# Patient Record
Sex: FEMALE | Race: WHITE | NOT HISPANIC OR LATINO | URBAN - METROPOLITAN AREA
[De-identification: names, ages, dates, MRNs, and addresses within clinical notes are randomized per-mention and may not be internally consistent; named-entity substitution may affect disease eponyms.]

---

## 2017-02-20 ENCOUNTER — OUTPATIENT (OUTPATIENT)
Dept: OUTPATIENT SERVICES | Facility: HOSPITAL | Age: 52
LOS: 1 days | End: 2017-02-20

## 2017-03-02 ENCOUNTER — OUTPATIENT (OUTPATIENT)
Dept: OUTPATIENT SERVICES | Facility: HOSPITAL | Age: 52
LOS: 1 days | End: 2017-03-02

## 2017-03-08 ENCOUNTER — OUTPATIENT (OUTPATIENT)
Dept: OUTPATIENT SERVICES | Facility: HOSPITAL | Age: 52
LOS: 1 days | End: 2017-03-08
Payer: COMMERCIAL

## 2017-03-08 PROCEDURE — 76770 US EXAM ABDO BACK WALL COMP: CPT | Mod: 26

## 2017-03-10 ENCOUNTER — OUTPATIENT (OUTPATIENT)
Dept: OUTPATIENT SERVICES | Facility: HOSPITAL | Age: 52
LOS: 1 days | End: 2017-03-10

## 2017-03-12 ENCOUNTER — OUTPATIENT (OUTPATIENT)
Dept: OUTPATIENT SERVICES | Facility: HOSPITAL | Age: 52
LOS: 1 days | End: 2017-03-12
Payer: COMMERCIAL

## 2017-03-12 PROCEDURE — 74177 CT ABD & PELVIS W/CONTRAST: CPT | Mod: 26

## 2018-02-21 ENCOUNTER — INPATIENT (INPATIENT)
Facility: HOSPITAL | Age: 53
LOS: 1 days | Discharge: HOME | End: 2018-02-23
Payer: COMMERCIAL

## 2018-02-21 PROCEDURE — 74177 CT ABD & PELVIS W/CONTRAST: CPT | Mod: 26

## 2018-02-22 PROCEDURE — 76856 US EXAM PELVIC COMPLETE: CPT | Mod: 26

## 2018-02-22 PROCEDURE — 72148 MRI LUMBAR SPINE W/O DYE: CPT | Mod: 26

## 2018-02-22 PROCEDURE — 76830 TRANSVAGINAL US NON-OB: CPT | Mod: 26

## 2018-05-01 PROBLEM — Z00.00 ENCOUNTER FOR PREVENTIVE HEALTH EXAMINATION: Status: ACTIVE | Noted: 2018-05-01

## 2018-05-09 ENCOUNTER — OUTPATIENT (OUTPATIENT)
Dept: OUTPATIENT SERVICES | Facility: HOSPITAL | Age: 53
LOS: 1 days | End: 2018-05-09

## 2018-07-26 ENCOUNTER — OTHER (OUTPATIENT)
Age: 53
End: 2018-07-26

## 2019-09-14 RX ORDER — IODOQUINOL, HYDROCORTISONE ACETATE AND ALOE VERA LEAF 10; 20; 10 MG/G; MG/G; MG/G
1-2-1 GEL TOPICAL 3 TIMES DAILY
Qty: 1 | Refills: 3 | Status: ACTIVE | COMMUNITY
Start: 2019-09-14 | End: 1900-01-01

## 2020-01-08 ENCOUNTER — APPOINTMENT (OUTPATIENT)
Dept: OBGYN | Facility: CLINIC | Age: 55
End: 2020-01-08

## 2020-01-28 ENCOUNTER — APPOINTMENT (OUTPATIENT)
Dept: OBGYN | Facility: CLINIC | Age: 55
End: 2020-01-28
Payer: COMMERCIAL

## 2020-01-28 VITALS
WEIGHT: 170 LBS | BODY MASS INDEX: 28.32 KG/M2 | DIASTOLIC BLOOD PRESSURE: 90 MMHG | SYSTOLIC BLOOD PRESSURE: 134 MMHG | HEIGHT: 65 IN

## 2020-01-28 DIAGNOSIS — Z86.018 PERSONAL HISTORY OF OTHER BENIGN NEOPLASM: ICD-10-CM

## 2020-01-28 DIAGNOSIS — R10.30 LOWER ABDOMINAL PAIN, UNSPECIFIED: ICD-10-CM

## 2020-01-28 DIAGNOSIS — Z87.898 PERSONAL HISTORY OF OTHER SPECIFIED CONDITIONS: ICD-10-CM

## 2020-01-28 PROCEDURE — 99396 PREV VISIT EST AGE 40-64: CPT

## 2020-01-28 RX ORDER — LANSOPRAZOLE 30 MG
VIAL (EA) INTRAVENOUS
Refills: 0 | Status: ACTIVE | COMMUNITY

## 2020-01-28 RX ORDER — LEVOTHYROXINE SODIUM 0.17 MG/1
TABLET ORAL
Refills: 0 | Status: ACTIVE | COMMUNITY

## 2020-01-28 NOTE — CHIEF COMPLAINT
[Annual Visit] : annual visit [FreeTextEntry1] : 53 y/o P2 with hx of one  after primary c/s. Hx of renal cancer in 2017, parathyroidectomy in 2018 and microadenoma years ago which has not been seen again on subsequent cat scans after treatment.\par Pt presents for annual exam\par LMP was in 2017-no vaginal bleeding since that time\par MAmmo was BIRAD 1 in beginning of 2020\par COlonoscopy was 2 years ago\par Bone scan was normal 2018\par Pt sexually active with only one partner-declines STI testing

## 2020-01-29 LAB — HPV HIGH+LOW RISK DNA PNL CVX: NOT DETECTED

## 2020-02-03 LAB — CYTOLOGY CVX/VAG DOC THIN PREP: ABNORMAL

## 2020-04-26 ENCOUNTER — MESSAGE (OUTPATIENT)
Age: 55
End: 2020-04-26

## 2020-05-05 ENCOUNTER — APPOINTMENT (OUTPATIENT)
Dept: DISASTER EMERGENCY | Facility: CLINIC | Age: 55
End: 2020-05-05

## 2020-05-06 ENCOUNTER — APPOINTMENT (OUTPATIENT)
Dept: DISASTER EMERGENCY | Facility: HOSPITAL | Age: 55
End: 2020-05-06

## 2020-05-07 LAB
SARS-COV-2 IGG SERPL IA-ACNC: 0.7 AU/ML
SARS-COV-2 IGG SERPL QL IA: NEGATIVE

## 2021-05-26 ENCOUNTER — APPOINTMENT (OUTPATIENT)
Dept: OBGYN | Facility: CLINIC | Age: 56
End: 2021-05-26
Payer: COMMERCIAL

## 2021-05-26 VITALS
BODY MASS INDEX: 29.99 KG/M2 | HEIGHT: 65 IN | DIASTOLIC BLOOD PRESSURE: 96 MMHG | SYSTOLIC BLOOD PRESSURE: 146 MMHG | WEIGHT: 180 LBS

## 2021-05-26 DIAGNOSIS — N94.10 UNSPECIFIED DYSPAREUNIA: ICD-10-CM

## 2021-05-26 DIAGNOSIS — N95.1 MENOPAUSAL AND FEMALE CLIMACTERIC STATES: ICD-10-CM

## 2021-05-26 DIAGNOSIS — Z85.528 PERSONAL HISTORY OF OTHER MALIGNANT NEOPLASM OF KIDNEY: ICD-10-CM

## 2021-05-26 DIAGNOSIS — N95.2 POSTMENOPAUSAL ATROPHIC VAGINITIS: ICD-10-CM

## 2021-05-26 PROCEDURE — 99213 OFFICE O/P EST LOW 20 MIN: CPT | Mod: 25

## 2021-05-26 PROCEDURE — 99072 ADDL SUPL MATRL&STAF TM PHE: CPT

## 2021-05-26 PROCEDURE — 99396 PREV VISIT EST AGE 40-64: CPT | Mod: 25

## 2021-05-26 NOTE — DISCUSSION/SUMMARY
[FreeTextEntry1] : 57 y/o P2 with normal annual exam today-vaginal atrophy and dysparunia \par Pap collected\par Mammo rx'd\par Discussed with pt vaginal dryness causes and treatment\par She has hot flushes but these are not bothering her as much as the dryness\par She does not want systemic estrogen\par Explained lubricants and moisturizers and types of vaginal estrogen\par Sent estring rx-if too expensive will switch to tablets. Pt aware of this and how to use discussed\par F/U for annual or prn

## 2021-05-26 NOTE — PHYSICAL EXAM
[Appropriately responsive] : appropriately responsive [Alert] : alert [No Acute Distress] : no acute distress [No Lymphadenopathy] : no lymphadenopathy [No Murmurs] : no murmurs [Soft] : soft [Non-tender] : non-tender [Non-distended] : non-distended [No HSM] : No HSM [No Lesions] : no lesions [No Mass] : no mass [Oriented x3] : oriented x3 [Examination Of The Breasts] : a normal appearance [No Masses] : no breast masses were palpable [Vulvar Atrophy] : vulvar atrophy [Labia Majora] : normal [Labia Minora] : normal [Atrophy] : atrophy [Scant] : There was scant vaginal bleeding [Normal] : normal [Uterine Adnexae] : normal [FreeTextEntry4] : b [FreeTextEntry5] : small bleeding with intro of cytobrush

## 2021-05-26 NOTE — HISTORY OF PRESENT ILLNESS
[FreeTextEntry1] : 55 y/o P2 presents for annual exam. Pt has hx of kidney cancer \par REceived from pt oncologist full report which reports good recovery-now extending to yearly scans and visits\par Pt last pap was NILM with neg HPV\par Mammo was BIRAD 1 1/2020\par Bone denisty 2018 was normal\par Colonoscopy in 2018 was normal\par LMP was 2018-no vb since then\par Sexually active with intercourse becoming more painful recently and does not like vaginal dryness\par No other complaints-declines STI testing\par Does want yearly paps\par

## 2021-05-28 LAB — HPV HIGH+LOW RISK DNA PNL CVX: NOT DETECTED

## 2021-06-02 LAB — CYTOLOGY CVX/VAG DOC THIN PREP: ABNORMAL

## 2022-07-29 ENCOUNTER — RX RENEWAL (OUTPATIENT)
Age: 57
End: 2022-07-29

## 2022-10-24 DIAGNOSIS — R30.0 DYSURIA: ICD-10-CM

## 2022-10-24 RX ORDER — PHENAZOPYRIDINE 100 MG/1
100 TABLET, FILM COATED ORAL EVERY 6 HOURS
Qty: 12 | Refills: 0 | Status: ACTIVE | COMMUNITY
Start: 2022-10-24 | End: 1900-01-01

## 2022-10-24 RX ORDER — NITROFURANTOIN (MONOHYDRATE/MACROCRYSTALS) 25; 75 MG/1; MG/1
100 CAPSULE ORAL
Qty: 14 | Refills: 0 | Status: ACTIVE | COMMUNITY
Start: 2022-10-24 | End: 1900-01-01

## 2023-01-04 ENCOUNTER — NON-APPOINTMENT (OUTPATIENT)
Age: 58
End: 2023-01-04

## 2023-01-12 ENCOUNTER — APPOINTMENT (OUTPATIENT)
Dept: OPHTHALMOLOGY | Facility: CLINIC | Age: 58
End: 2023-01-12
Payer: COMMERCIAL

## 2023-01-12 ENCOUNTER — NON-APPOINTMENT (OUTPATIENT)
Age: 58
End: 2023-01-12

## 2023-01-12 PROCEDURE — 92004 COMPRE OPH EXAM NEW PT 1/>: CPT

## 2023-02-07 ENCOUNTER — APPOINTMENT (OUTPATIENT)
Dept: OBGYN | Facility: CLINIC | Age: 58
End: 2023-02-07
Payer: COMMERCIAL

## 2023-02-07 VITALS
HEIGHT: 65 IN | BODY MASS INDEX: 30.82 KG/M2 | DIASTOLIC BLOOD PRESSURE: 64 MMHG | WEIGHT: 185 LBS | SYSTOLIC BLOOD PRESSURE: 118 MMHG

## 2023-02-07 DIAGNOSIS — N95.1 MENOPAUSAL AND FEMALE CLIMACTERIC STATES: ICD-10-CM

## 2023-02-07 DIAGNOSIS — E06.3 AUTOIMMUNE THYROIDITIS: ICD-10-CM

## 2023-02-07 DIAGNOSIS — Z82.3 FAMILY HISTORY OF STROKE: ICD-10-CM

## 2023-02-07 DIAGNOSIS — Z80.9 FAMILY HISTORY OF MALIGNANT NEOPLASM, UNSPECIFIED: ICD-10-CM

## 2023-02-07 DIAGNOSIS — Z83.3 FAMILY HISTORY OF DIABETES MELLITUS: ICD-10-CM

## 2023-02-07 PROCEDURE — 99396 PREV VISIT EST AGE 40-64: CPT

## 2023-02-07 RX ORDER — LEVOTHYROXINE SODIUM 0.03 MG/1
25 TABLET ORAL
Qty: 90 | Refills: 3 | Status: ACTIVE | COMMUNITY
Start: 2023-02-07 | End: 1900-01-01

## 2023-02-07 NOTE — HISTORY OF PRESENT ILLNESS
[Patient reported PAP Smear was normal] : Patient reported PAP Smear was normal [No] : Patient does not have concerns regarding sex [FreeTextEntry1] : estring happy with it, decreased dryness [PapSmeardate] : 5/21 [LMPDate] : 10/2018

## 2023-02-07 NOTE — PHYSICAL EXAM
[Appropriately responsive] : appropriately responsive [Alert] : alert [No Acute Distress] : no acute distress [No Lymphadenopathy] : no lymphadenopathy [Regular Rate Rhythm] : regular rate rhythm [No Murmurs] : no murmurs [Clear to Auscultation B/L] : clear to auscultation bilaterally [Soft] : soft [Non-tender] : non-tender [Non-distended] : non-distended [No HSM] : No HSM [Oriented x3] : oriented x3 [Examination Of The Breasts] : a normal appearance [No Discharge] : no discharge [No Masses] : no breast masses were palpable [Labia Majora] : normal [Labia Minora] : normal [No Bleeding] : There was no active vaginal bleeding [Normal] : normal [Normal Position] : in a normal position [Tenderness] : nontender [Uterine Adnexae] : normal [FreeTextEntry4] : estring in place

## 2023-02-07 NOTE — PLAN
[FreeTextEntry1] : pap, hpv today\par Mammo rx given previously\par refills for estring and levothyroxine sent.\par Routine labs, TSH ordered, to be done fasting.\par Pt to establish care with PCP, f/u with endocrine and derm as discussed.\par RTO in 1 year or sooner prn\par \par Opal Tom CM

## 2023-02-08 LAB — HPV HIGH+LOW RISK DNA PNL CVX: NOT DETECTED

## 2023-02-20 LAB — CYTOLOGY CVX/VAG DOC THIN PREP: NORMAL

## 2024-03-12 ENCOUNTER — TRANSCRIPTION ENCOUNTER (OUTPATIENT)
Age: 59
End: 2024-03-12

## 2024-03-12 ENCOUNTER — APPOINTMENT (OUTPATIENT)
Dept: OBGYN | Facility: CLINIC | Age: 59
End: 2024-03-12
Payer: COMMERCIAL

## 2024-03-12 VITALS — SYSTOLIC BLOOD PRESSURE: 150 MMHG | DIASTOLIC BLOOD PRESSURE: 88 MMHG

## 2024-03-12 VITALS
HEIGHT: 65 IN | BODY MASS INDEX: 33.32 KG/M2 | SYSTOLIC BLOOD PRESSURE: 150 MMHG | WEIGHT: 200 LBS | DIASTOLIC BLOOD PRESSURE: 100 MMHG

## 2024-03-12 DIAGNOSIS — Z13.820 ENCOUNTER FOR SCREENING FOR OSTEOPOROSIS: ICD-10-CM

## 2024-03-12 DIAGNOSIS — Z01.419 ENCOUNTER FOR GYNECOLOGICAL EXAMINATION (GENERAL) (ROUTINE) W/OUT ABNORMAL FINDINGS: ICD-10-CM

## 2024-03-12 PROCEDURE — 99396 PREV VISIT EST AGE 40-64: CPT

## 2024-03-12 NOTE — COUNSELING
[Body Image] : body image [Vitamins/Supplements] : vitamins/supplements [Breast Self Exam] : breast self exam

## 2024-03-12 NOTE — HISTORY OF PRESENT ILLNESS
[Patient reported mammogram was normal] : Patient reported mammogram was normal [Patient reported PAP Smear was normal] : Patient reported PAP Smear was normal [Patient reported bone density results were normal] : Patient reported bone density results were normal [postmenopausal] : postmenopausal [N] : Patient does not use contraception [Y] : Positive pregnancy history [Mammogramdate] : 4/23 [TextBox_4] : 57 yo  for well woman exam.  Uses Estring for vaginal dryness and content with this.  Hx kidney cancer and has one kidney.   No hx abnormal pap smears, abnormal bleeding, fibroids, cysts.  No urinary complaints.  Past medical, surgical, social and family hx reviewed. [PapSmeardate] : 2/23 [BoneDensityDate] : 2/18 [PGHxTotal] : 2 [[facility] : 2 [[facility]] : 2

## 2024-03-12 NOTE — PHYSICAL EXAM
[Appropriately responsive] : appropriately responsive [Alert] : alert [No Lymphadenopathy] : no lymphadenopathy [No Acute Distress] : no acute distress [Regular Rate Rhythm] : regular rate rhythm [Clear to Auscultation B/L] : clear to auscultation bilaterally [No Murmurs] : no murmurs [Non-tender] : non-tender [Soft] : soft [Non-distended] : non-distended [No Lesions] : no lesions [Oriented x3] : oriented x3 [Examination Of The Breasts] : a normal appearance [No Masses] : no breast masses were palpable [Labia Majora] : normal [Labia Minora] : normal [Normal] : normal [Uterine Adnexae] : normal [FreeTextEntry4] : E-string in situ

## 2024-03-12 NOTE — PLAN
[FreeTextEntry1] : unremarkable CBE and pelvic exams SBE  reviewed Pap/HPV collected Mammo/DEXA ordered To follow up with PCP/Cardiology for elevated BP, denies headache, dizziness, visual changes I reviewed dietary, vitamin and exercise measures for maintaining optimal bone density and patient verbalizes understanding of these recommendations. Healthy diet,exercise and sleep hygiene discussed The importance of a screening colonoscopy was discussed.

## 2024-03-13 LAB — HPV HIGH+LOW RISK DNA PNL CVX: NOT DETECTED

## 2024-03-14 LAB — CYTOLOGY CVX/VAG DOC THIN PREP: NORMAL

## 2024-04-28 RX ORDER — ESTRADIOL 2 MG/1
7.5 SYSTEM VAGINAL
Qty: 1 | Refills: 3 | Status: ACTIVE | COMMUNITY
Start: 2021-05-26

## 2025-07-14 ENCOUNTER — APPOINTMENT (OUTPATIENT)
Dept: OBGYN | Facility: CLINIC | Age: 60
End: 2025-07-14
Payer: COMMERCIAL

## 2025-07-14 ENCOUNTER — NON-APPOINTMENT (OUTPATIENT)
Age: 60
End: 2025-07-14

## 2025-07-14 VITALS
SYSTOLIC BLOOD PRESSURE: 137 MMHG | DIASTOLIC BLOOD PRESSURE: 92 MMHG | HEIGHT: 65 IN | BODY MASS INDEX: 33.32 KG/M2 | WEIGHT: 200 LBS

## 2025-07-14 PROCEDURE — 99396 PREV VISIT EST AGE 40-64: CPT

## 2025-07-16 LAB — HPV HIGH+LOW RISK DNA PNL CVX: NOT DETECTED

## 2025-07-21 ENCOUNTER — NON-APPOINTMENT (OUTPATIENT)
Age: 60
End: 2025-07-21

## 2025-07-22 ENCOUNTER — NON-APPOINTMENT (OUTPATIENT)
Age: 60
End: 2025-07-22

## 2025-07-23 LAB — CYTOLOGY CVX/VAG DOC THIN PREP: ABNORMAL

## 2025-08-23 ENCOUNTER — NON-APPOINTMENT (OUTPATIENT)
Age: 60
End: 2025-08-23

## 2025-08-25 ENCOUNTER — APPOINTMENT (OUTPATIENT)
Dept: OBGYN | Facility: CLINIC | Age: 60
End: 2025-08-25
Payer: COMMERCIAL

## 2025-08-25 VITALS
HEIGHT: 65 IN | BODY MASS INDEX: 33.32 KG/M2 | SYSTOLIC BLOOD PRESSURE: 154 MMHG | DIASTOLIC BLOOD PRESSURE: 97 MMHG | WEIGHT: 200 LBS

## 2025-08-25 DIAGNOSIS — R87.619 UNSPECIFIED ABNORMAL CYTOLOGICAL FINDINGS IN SPECIMENS FROM CERVIX UTERI: ICD-10-CM

## 2025-08-25 PROCEDURE — 99203 OFFICE O/P NEW LOW 30 MIN: CPT

## 2025-08-25 RX ORDER — DIAZEPAM 5 MG/1
5 TABLET ORAL
Qty: 1 | Refills: 0 | Status: ACTIVE | COMMUNITY
Start: 2025-08-25 | End: 1900-01-01

## 2025-08-25 RX ORDER — MISOPROSTOL 200 UG/1
200 TABLET ORAL DAILY
Qty: 2 | Refills: 0 | Status: ACTIVE | COMMUNITY
Start: 2025-08-25 | End: 1900-01-01

## 2025-08-27 ENCOUNTER — APPOINTMENT (OUTPATIENT)
Dept: OBGYN | Facility: CLINIC | Age: 60
End: 2025-08-27

## 2025-09-19 ENCOUNTER — APPOINTMENT (OUTPATIENT)
Dept: OBGYN | Facility: CLINIC | Age: 60
End: 2025-09-19

## 2025-09-19 VITALS
DIASTOLIC BLOOD PRESSURE: 80 MMHG | SYSTOLIC BLOOD PRESSURE: 129 MMHG | WEIGHT: 205 LBS | BODY MASS INDEX: 34.16 KG/M2 | HEIGHT: 65 IN

## 2025-09-19 DIAGNOSIS — R87.619 UNSPECIFIED ABNORMAL CYTOLOGICAL FINDINGS IN SPECIMENS FROM CERVIX UTERI: ICD-10-CM

## 2025-09-19 RX ORDER — KETOROLAC TROMETHAMINE 30 MG/ML
30 VIAL (ML) INJECTION
Qty: 1 | Refills: 0 | Status: COMPLETED | OUTPATIENT
Start: 2025-09-19

## 2025-09-19 RX ADMIN — KETOROLAC TROMETHAMINE 0 MG/ML: 30 INJECTION INTRAMUSCULAR; INTRAVENOUS at 00:00

## 2025-09-24 LAB — CORE LAB BIOPSY: NORMAL
